# Patient Record
Sex: FEMALE | Race: OTHER | HISPANIC OR LATINO | URBAN - METROPOLITAN AREA
[De-identification: names, ages, dates, MRNs, and addresses within clinical notes are randomized per-mention and may not be internally consistent; named-entity substitution may affect disease eponyms.]

---

## 2019-02-18 ENCOUNTER — EMERGENCY (EMERGENCY)
Facility: HOSPITAL | Age: 39
LOS: 1 days | Discharge: HOME | End: 2019-02-18
Attending: EMERGENCY MEDICINE | Admitting: EMERGENCY MEDICINE
Payer: MEDICAID

## 2019-02-18 VITALS
OXYGEN SATURATION: 100 % | SYSTOLIC BLOOD PRESSURE: 121 MMHG | RESPIRATION RATE: 22 BRPM | HEART RATE: 93 BPM | DIASTOLIC BLOOD PRESSURE: 76 MMHG | WEIGHT: 123.9 LBS | TEMPERATURE: 99 F

## 2019-02-18 VITALS
TEMPERATURE: 99 F | RESPIRATION RATE: 18 BRPM | OXYGEN SATURATION: 99 % | SYSTOLIC BLOOD PRESSURE: 107 MMHG | DIASTOLIC BLOOD PRESSURE: 71 MMHG

## 2019-02-18 DIAGNOSIS — O20.9 HEMORRHAGE IN EARLY PREGNANCY, UNSPECIFIED: ICD-10-CM

## 2019-02-18 DIAGNOSIS — Z3A.01 LESS THAN 8 WEEKS GESTATION OF PREGNANCY: ICD-10-CM

## 2019-02-18 LAB
ALBUMIN SERPL ELPH-MCNC: 4.3 G/DL — SIGNIFICANT CHANGE UP (ref 3.3–5)
ALP SERPL-CCNC: 61 U/L — SIGNIFICANT CHANGE UP (ref 40–120)
ALT FLD-CCNC: 10 U/L — SIGNIFICANT CHANGE UP (ref 10–45)
ANION GAP SERPL CALC-SCNC: 13 MMOL/L — SIGNIFICANT CHANGE UP (ref 5–17)
APPEARANCE UR: CLEAR — SIGNIFICANT CHANGE UP
AST SERPL-CCNC: 16 U/L — SIGNIFICANT CHANGE UP (ref 10–40)
BACTERIA # UR AUTO: PRESENT /HPF
BASOPHILS # BLD AUTO: 0.06 K/UL — SIGNIFICANT CHANGE UP (ref 0–0.2)
BASOPHILS NFR BLD AUTO: 0.6 % — SIGNIFICANT CHANGE UP (ref 0–2)
BILIRUB SERPL-MCNC: 0.3 MG/DL — SIGNIFICANT CHANGE UP (ref 0.2–1.2)
BILIRUB UR-MCNC: NEGATIVE — SIGNIFICANT CHANGE UP
BLD GP AB SCN SERPL QL: NEGATIVE — SIGNIFICANT CHANGE UP
BUN SERPL-MCNC: 12 MG/DL — SIGNIFICANT CHANGE UP (ref 7–23)
CALCIUM SERPL-MCNC: 9.2 MG/DL — SIGNIFICANT CHANGE UP (ref 8.4–10.5)
CHLORIDE SERPL-SCNC: 104 MMOL/L — SIGNIFICANT CHANGE UP (ref 96–108)
CO2 SERPL-SCNC: 21 MMOL/L — LOW (ref 22–31)
COLOR SPEC: YELLOW — SIGNIFICANT CHANGE UP
CREAT SERPL-MCNC: 0.63 MG/DL — SIGNIFICANT CHANGE UP (ref 0.5–1.3)
DIFF PNL FLD: ABNORMAL
EOSINOPHIL # BLD AUTO: 0.39 K/UL — SIGNIFICANT CHANGE UP (ref 0–0.5)
EOSINOPHIL NFR BLD AUTO: 4.1 % — SIGNIFICANT CHANGE UP (ref 0–6)
EPI CELLS # UR: ABNORMAL /HPF (ref 0–5)
EXTRA SST TUBE: SIGNIFICANT CHANGE UP
GLUCOSE SERPL-MCNC: 113 MG/DL — HIGH (ref 70–99)
GLUCOSE UR QL: NEGATIVE — SIGNIFICANT CHANGE UP
HCG SERPL-ACNC: 3600 MIU/ML — HIGH
HCT VFR BLD CALC: 39.3 % — SIGNIFICANT CHANGE UP (ref 34.5–45)
HGB BLD-MCNC: 13.3 G/DL — SIGNIFICANT CHANGE UP (ref 11.5–15.5)
IMM GRANULOCYTES NFR BLD AUTO: 0.3 % — SIGNIFICANT CHANGE UP (ref 0–1.5)
KETONES UR-MCNC: NEGATIVE — SIGNIFICANT CHANGE UP
LEUKOCYTE ESTERASE UR-ACNC: ABNORMAL
LYMPHOCYTES # BLD AUTO: 2.72 K/UL — SIGNIFICANT CHANGE UP (ref 1–3.3)
LYMPHOCYTES # BLD AUTO: 28.5 % — SIGNIFICANT CHANGE UP (ref 13–44)
MCHC RBC-ENTMCNC: 31.5 PG — SIGNIFICANT CHANGE UP (ref 27–34)
MCHC RBC-ENTMCNC: 33.8 GM/DL — SIGNIFICANT CHANGE UP (ref 32–36)
MCV RBC AUTO: 93.1 FL — SIGNIFICANT CHANGE UP (ref 80–100)
MONOCYTES # BLD AUTO: 0.53 K/UL — SIGNIFICANT CHANGE UP (ref 0–0.9)
MONOCYTES NFR BLD AUTO: 5.6 % — SIGNIFICANT CHANGE UP (ref 2–14)
NEUTROPHILS # BLD AUTO: 5.8 K/UL — SIGNIFICANT CHANGE UP (ref 1.8–7.4)
NEUTROPHILS NFR BLD AUTO: 60.9 % — SIGNIFICANT CHANGE UP (ref 43–77)
NITRITE UR-MCNC: NEGATIVE — SIGNIFICANT CHANGE UP
NRBC # BLD: 0 /100 WBCS — SIGNIFICANT CHANGE UP (ref 0–0)
PH UR: 5.5 — SIGNIFICANT CHANGE UP (ref 5–8)
PLATELET # BLD AUTO: 323 K/UL — SIGNIFICANT CHANGE UP (ref 150–400)
POTASSIUM SERPL-MCNC: 3.8 MMOL/L — SIGNIFICANT CHANGE UP (ref 3.5–5.3)
POTASSIUM SERPL-SCNC: 3.8 MMOL/L — SIGNIFICANT CHANGE UP (ref 3.5–5.3)
PROT SERPL-MCNC: 8.4 G/DL — HIGH (ref 6–8.3)
PROT UR-MCNC: NEGATIVE MG/DL — SIGNIFICANT CHANGE UP
RBC # BLD: 4.22 M/UL — SIGNIFICANT CHANGE UP (ref 3.8–5.2)
RBC # FLD: 12.4 % — SIGNIFICANT CHANGE UP (ref 10.3–14.5)
RBC CASTS # UR COMP ASSIST: ABNORMAL /HPF
RH IG SCN BLD-IMP: POSITIVE — SIGNIFICANT CHANGE UP
SODIUM SERPL-SCNC: 138 MMOL/L — SIGNIFICANT CHANGE UP (ref 135–145)
SP GR SPEC: <=1.005 — SIGNIFICANT CHANGE UP (ref 1–1.03)
UROBILINOGEN FLD QL: 0.2 E.U./DL — SIGNIFICANT CHANGE UP
WBC # BLD: 9.53 K/UL — SIGNIFICANT CHANGE UP (ref 3.8–10.5)
WBC # FLD AUTO: 9.53 K/UL — SIGNIFICANT CHANGE UP (ref 3.8–10.5)
WBC UR QL: ABNORMAL /HPF

## 2019-02-18 PROCEDURE — 76817 TRANSVAGINAL US OBSTETRIC: CPT | Mod: 26

## 2019-02-18 PROCEDURE — 76801 OB US < 14 WKS SINGLE FETUS: CPT | Mod: 26

## 2019-02-18 PROCEDURE — 99284 EMERGENCY DEPT VISIT MOD MDM: CPT

## 2019-02-18 RX ORDER — ACETAMINOPHEN 500 MG
1000 TABLET ORAL ONCE
Refills: 0 | Status: COMPLETED | OUTPATIENT
Start: 2019-02-18 | End: 2019-02-18

## 2019-02-18 RX ADMIN — Medication 1000 MILLIGRAM(S): at 21:17

## 2019-02-18 NOTE — ED PROVIDER NOTE - PHYSICAL EXAMINATION
General: Patient is well developed and well nourised. Patient is alert and oriented to person, place and date. Patient is laying comfortably in stretcher and appears in no acute distress.  HEENT: Head is normocephalic and atraumatic. Pupils are equal, round and reactive. Extraocular movements intact. No evidence of nystagmus, conjunctival injection, or scleral icterus. External ears symmetric without evidence of discharge.  Nose is symmetric, non-tender, patent without evidence of discharge. Teeth in good repair. Uvula midline.   Neck: Supple with no evidence of lymphadenopathy.  Full range of motion.  Heart: Regular rate and rhythm. No murmurs, rubs or gallops.   Lungs: Clear to auscultation bilaterally with equal chest expansion. No note of wheezes, rhonchi, rales. Equal chest expansion. No note of retractions.  Abdomen: ttp suprapubic region. Bowel sounds present in all four quadrants. Soft, non-tender, non-distended without signs of masses, rebound or guarding. No note of hepatosplenomegaly. No CVA tenderness bilaterally. Negative Adhikari sign. No pain present over McBurney's point.   Skin: Warm, dry and intact without evidence of rashes, bruising, pallor, jaundice or cyanosis.   Psych: Mood and affect appropriate.

## 2019-02-18 NOTE — CONSULT NOTE ADULT - ASSESSMENT
39y [id number]  at 7w4d based off Last Menstrual Period 12/27/18 presenting with heavy vaginal bleeding and abdominal cramps for 24hrs.  -hemodynamically stable and Hgb 13.3   - likely MAB based on heavy vaginal bleeding with cramps  - sonogram shows gestational sac, recommend follow up this week with GYN provider, given referral to [facility] to make appointment for follow up BHCG and sonogram, if unable to make appointment recommend returning to ED for repeat labs and sonogram  - if pain worsens or vaginal bleeding increases and becomes symptomatic, lightheaded/dizzy, should immediately return to hospital  - tylenol for pain  - plan d/w Dr. Flores 39y [id number]  at 7w4d based off Last Menstrual Period 12/27/18 presenting with heavy vaginal bleeding and abdominal cramps for 24hrs.  -hemodynamically stable and Hgb 13.3   - likely MAB based on heavy vaginal bleeding with cramps  - sonogram shows gestational sac, recommend follow up this week with GYN provider, given referral to [facility] to make appointment for follow up BHCG and sonogram, if unable to make appointment recommend returning to ED for repeat labs and sonogram  - if pain worsens or vaginal bleeding increases and becomes symptomatic, lightheaded/dizzy, should immediately return to hospital  -please obtain type and scree, if Rh negative will need Rhogam, if Rh positive safe for discharge   - tylenol for pain  - plan d/w Dr. Flores 39y [id number]  at 7w4d based off Last Menstrual Period 12/27/18 presenting with heavy vaginal bleeding and abdominal cramps for 24hrs.  -hemodynamically stable and Hgb 13.3   - likely MAB based on heavy vaginal bleeding with cramps  - sonogram shows gestational sac, recommend follow up this week with GYN provider, given referral to [facility] to make appointment for follow up BHCG and sonogram, if unable to make appointment recommend returning to ED for repeat labs and sonogram  - if pain worsens or vaginal bleeding increases and becomes symptomatic, lightheaded/dizzy, should immediately return to hospital  -please obtain type and screen, if Rh negative will need Rhogam, if Rh positive safe for discharge   - tylenol for pain  - plan d/w Dr. Flores

## 2019-02-18 NOTE — ED ADULT NURSE NOTE - OBJECTIVE STATEMENT
Patient , awake, A&O x 3, NAD, presents to ED for vaginal bleeding.  She is 6 wks pregnant, experiencing vaginal spotting for 2 wks and yesterday she starting experiencing heavy bleeding with clots, similar to a menstrual cycle.  Using 1 pad every 4-5 hours.  Denies dizziness, weakness, SOB, nausea or vomiting.  No past medical history.

## 2019-02-18 NOTE — CONSULT NOTE ADULT - SUBJECTIVE AND OBJECTIVE BOX
39y [id number]  at 7w4d based off Last Menstrual Period 12/27/18 presenting with heavy vaginal bleeding and abdominal cramps for 24hrs. States she has not seen a GYN provider yet for pregnancy. States this is a desired pregnancy. Pain and bleeding started last night and she has gone through approximately 5 pads since last night. Denies any other complaints.   Denies fever, chills, chest pain, palpitations, SOB, n/v.  +flatus, +BM    OB H/x:  G1- 2000- [facility] D&C    GYN H/x:  H/x of cysts, fibroids, endometriosis: fibroids  H/x of STIs: denies  Name of GYN Physician: does not have provider     PAST MEDICAL & SURGICAL HISTORY:  Breast Augmentation  Right Femur Surgery    MEDICATIONS  (STANDING):  PNV    Allergies    No Known Allergies       Vital Signs Last 24 Hrs  T(C): 37.1 (18 Feb 2019 16:39), Max: 37.1 (18 Feb 2019 16:39)  T(F): 98.8 (18 Feb 2019 16:39), Max: 98.8 (18 Feb 2019 16:39)  HR: 93 (18 Feb 2019 16:39) (93 - 93)  BP: 121/76 (18 Feb 2019 16:39) (121/76 - 121/76)  BP(mean): --  RR: 22 (18 Feb 2019 16:39) (22 - 22)  SpO2: 100% (18 Feb 2019 16:39) (100% - 100%)    Physical Exam:  Gen: NAD, comfortable  GI: soft, nontender, nondistended + BS, no rebound no guarding  BME: patient declined exam, discussed with patient risk of retained products and unable to fully evaluate, patient understood risk and deferred  Spec: patient declined exam, discussed with patient risk of retained products and unable to fully evaluate, patient understood risk and deferred  Ext: no edema, erythema, tenderness     LABS:                        13.3   9.53  )-----------( 323      ( 18 Feb 2019 17:00 )             39.3     02-18    138  |  104  |  12  ----------------------------<  113<H>  3.8   |  21<L>  |  0.63    Ca    9.2      18 Feb 2019 17:00    TPro  8.4<H>  /  Alb  4.3  /  TBili  0.3  /  DBili  x   /  AST  16  /  ALT  10  /  AlkPhos  61  02-18      Urinalysis Basic - ( 18 Feb 2019 16:53 )    Color: Yellow / Appearance: Clear / SG: <=1.005 / pH: x  Gluc: x / Ketone: NEGATIVE  / Bili: Negative / Urobili: 0.2 E.U./dL   Blood: x / Protein: NEGATIVE mg/dL / Nitrite: NEGATIVE   Leuk Esterase: Trace / RBC: 5-10 /HPF / WBC 5-10 /HPF   Sq Epi: x / Non Sq Epi: 5-10 /HPF / Bacteria: Present /HPF        RADIOLOGY & ADDITIONAL STUDIES:

## 2019-02-18 NOTE — CONSULT NOTE ADULT - ATTENDING COMMENTS
Patient with possible threatened vs incomplete SAB. Declines exam and understands bleeding likely to continue especially if products not fully evacuated.   Strict bleeding and pain precautions reviewed.   Return to office this week for management.  Follow up RH status prior to discharge

## 2019-02-18 NOTE — ED ADULT NURSE NOTE - NSIMPLEMENTINTERV_GEN_ALL_ED
Implemented All Universal Safety Interventions:  [place] to call system. Call bell, personal items and telephone within reach. Instruct patient to call for assistance. Room bathroom lighting operational. Non-slip footwear when patient is off stretcher. Physically safe environment: no spills, clutter or unnecessary equipment. Stretcher in lowest position, wheels locked, appropriate side rails in place.

## 2019-02-18 NOTE — ED PROVIDER NOTE - CLINICAL SUMMARY MEDICAL DECISION MAKING FREE TEXT BOX
This is a pleasant 39 year old female , currently 6 weeks pregnant, presenting to the ed with vaginal bleeding. began with spotting 3-4 days ago, with increased bleeding over the past day soaking 2 pads in 8 hours. physical exam, patient appears well, non-toxic. discomfort to palpitation suprapubic region. This is a pleasant 39 year old female , currently 6 weeks pregnant, presenting to the ed with vaginal bleeding. began with spotting 3-4 days ago, with increased bleeding over the past day soaking 2 pads in 8 hours. physical exam, patient appears well, non-toxic. discomfort to palpitation suprapubic region. quant [id number], patient sent for us which shows sac like structure in endometrial cavity, GYN to see and evaluate patient at bedside. This is a pleasant 39 year old female , currently 6 weeks pregnant, presenting to the ed with vaginal bleeding. began with spotting 3-4 days ago, with increased bleeding over the past day soaking 2 pads in 8 hours. physical exam, patient appears well, non-toxic. discomfort to palpitation suprapubic region. quant 3600, patient sent for us which shows sac like structure in endometrial cavity, GYN to see and evaluate patient at bedside. Likely miscarraige and recommend follow up this week in office. present back immediately to ED for any worsening of symptoms. Patient understands that symptoms may worsen and the testing in the emergency department does not rule out the early stages of a possible surgical condition. ED evaluation and management discussed with the patient and family (if available) in detail.  Close PMD follow up encouraged.  Strict ED return instructions discussed in detail and patient given the opportunity to ask any questions about their discharge diagnosis and instructions. Patient verbalized understanding. Patient is agreeable to plan.

## 2019-02-18 NOTE — ED PROVIDER NOTE - CHIEF COMPLAINT
The patient is a 39y Female [id number] of vaginal bleeding. The patient is a 39y Female , who is currently 6 weeks pregnant, presenting with vaginal bleeding.

## 2019-02-18 NOTE — ED PROVIDER NOTE - OBJECTIVE STATEMENT
States that symptoms began with spotting appx 3 days ago. states that today she began to have lower abdominal cramping and "bleeding like a period". states that she went through 2 pads in 8 hours. no fevers, chills, nausea, vomitting or diarrhea. states that she has not need an obgyn as of yet. states that she had a recent uti 3 weeks ago, is not experiencing symptoms at this time. States that symptoms began with spotting appx 3 days ago. states that today she began to have lower abdominal cramping and "bleeding like a period". states that she went through 2 pads in 8 hours. no fevers, chills, nausea, vomit ting or diarrhea. states that she has not seen an obgyn as of yet. states that she had a recent uti 3 weeks ago, which was medicated with abx. states that she is not experiencing dysuria/uti symptoms at this time.

## 2019-02-19 LAB
CULTURE RESULTS: NO GROWTH — SIGNIFICANT CHANGE UP
SPECIMEN SOURCE: SIGNIFICANT CHANGE UP

## 2019-09-10 NOTE — ED PROVIDER NOTE - CROS ED CONS ALL NEG
[Awake] : awake [Alert] : alert [Soft] : soft [Oriented x3] : oriented to person, place, and time [Normal] : uterus [No Bleeding] : there was no active vaginal bleeding [IUD String] : had an IUD string protruding out [Uterine Adnexae] : were not tender and not enlarged negative... [Acute Distress] : no acute distress [Nipple Discharge] : no nipple discharge [Mass] : no breast mass [Tender] : non tender [Axillary LAD] : no axillary lymphadenopathy